# Patient Record
Sex: MALE | Race: WHITE | HISPANIC OR LATINO | Employment: FULL TIME | URBAN - METROPOLITAN AREA
[De-identification: names, ages, dates, MRNs, and addresses within clinical notes are randomized per-mention and may not be internally consistent; named-entity substitution may affect disease eponyms.]

---

## 2017-02-27 ENCOUNTER — HOSPITAL ENCOUNTER (EMERGENCY)
Facility: HOSPITAL | Age: 47
Discharge: HOME | End: 2017-02-27
Attending: EMERGENCY MEDICINE | Admitting: EMERGENCY MEDICINE
Payer: COMMERCIAL

## 2017-02-27 VITALS
TEMPERATURE: 96.9 F | RESPIRATION RATE: 20 BRPM | HEART RATE: 68 BPM | BODY MASS INDEX: 35 KG/M2 | WEIGHT: 223 LBS | SYSTOLIC BLOOD PRESSURE: 136 MMHG | HEIGHT: 67 IN | DIASTOLIC BLOOD PRESSURE: 78 MMHG | OXYGEN SATURATION: 97 %

## 2017-02-27 DIAGNOSIS — S09.90XA MINOR HEAD INJURY, INITIAL ENCOUNTER: ICD-10-CM

## 2017-02-27 DIAGNOSIS — S00.33XA CONTUSION OF NOSE, INITIAL ENCOUNTER: ICD-10-CM

## 2017-02-27 DIAGNOSIS — R04.0 NOSEBLEED: Primary | ICD-10-CM

## 2017-02-27 PROCEDURE — 99283 EMERGENCY DEPT VISIT LOW MDM: CPT

## 2017-02-27 RX ORDER — ESCITALOPRAM OXALATE 20 MG/1
20 TABLET ORAL DAILY
COMMUNITY
End: 2019-02-04 | Stop reason: SDUPTHER

## 2018-02-26 ENCOUNTER — APPOINTMENT (OUTPATIENT)
Dept: RADIOLOGY | Facility: CLINIC | Age: 48
End: 2018-02-26
Payer: OTHER MISCELLANEOUS

## 2018-02-26 ENCOUNTER — APPOINTMENT (OUTPATIENT)
Dept: URGENT CARE | Facility: CLINIC | Age: 48
End: 2018-02-26
Payer: OTHER MISCELLANEOUS

## 2018-02-26 DIAGNOSIS — M79.671 RIGHT FOOT PAIN: ICD-10-CM

## 2018-02-26 DIAGNOSIS — M79.671 RIGHT FOOT PAIN: Primary | ICD-10-CM

## 2018-02-26 PROCEDURE — 99283 EMERGENCY DEPT VISIT LOW MDM: CPT

## 2018-02-26 PROCEDURE — G0382 LEV 3 HOSP TYPE B ED VISIT: HCPCS

## 2018-02-26 PROCEDURE — 73630 X-RAY EXAM OF FOOT: CPT

## 2018-05-07 ENCOUNTER — TELEPHONE (OUTPATIENT)
Dept: BEHAVIORAL/MENTAL HEALTH CLINIC | Facility: CLINIC | Age: 48
End: 2018-05-07

## 2018-05-07 NOTE — TELEPHONE ENCOUNTER
Behavorial Health Outpatient Intake Questions    Referred by: PCP-DR KEMP    Check with provider before scheduling    Are there any developmental disabilities? No    Does the patient have hearing impairment? No    Does the patient have ICM or CTT? No    Taking injectable psychiatric medications? NoIf yes, patient can not be seen here  Has the patient ever seen or currently see a psychiatrist? Yes If yes who/when? SEEN ONCE ,UNABLE TO RECALL NAME    Has the patient ever seen or currently see a therapist? No If yes who/when? How many visits did the pt have for previous psychiatric treatment?  History    Has the patient served in the [place] [address]? Yes    If yes, have you had combat services? No    Was the patient activated into federal active duty as a member of the national guard or reserve? No    Minor Child    Who has custody of the child? N/A    Is there a custody agreement? N/A    If there is a custody agreement remind parent that they must bring a copy to the first appt or they will not be seen  [facility]     Presenting Problem (in patient's words) MOOD SWINGS,AGOROPHOBIA AND PARANOID    Substance Abuse:No concerns of substance abuse are reported  Has the patient been seen here previously, either inpatient or outpatient? Yes outpatient    If seen as outpatient, what provider(s) did the patient see? SEEN ON 5/4 WITH WIFE YAMILET BY LIZ FOR COUPLE'S COUNSELING    A member of the patient's family has been in therapy here with WIFE YAMILET SAW Chapincito Godinez as a patient Yes Appointment Date: 7/17/18 @ 1 [facility]    Referred Elsewhere?  No    Primary Care Physician: Noble Bentley MD    PCP telephone number: [phone]    SUB: BRADLY  INS: [facility]  ID: [id number]

## 2018-08-20 ENCOUNTER — OFFICE VISIT (OUTPATIENT)
Dept: BEHAVIORAL/MENTAL HEALTH CLINIC | Facility: CLINIC | Age: 48
End: 2018-08-20
Payer: COMMERCIAL

## 2018-08-20 DIAGNOSIS — F31.81 BIPOLAR II DISORDER (HCC): Primary | ICD-10-CM

## 2018-08-20 PROCEDURE — 90791 PSYCH DIAGNOSTIC EVALUATION: CPT | Performed by: SOCIAL WORKER

## 2018-08-20 NOTE — PSYCH
Assessment/Plan: I have had mood swings most of my life and they are getting worse  Tegretol  Subjective:      Patient ID: Dustin Obrien is a 51 y o  male  HPI:     Pre-morbid level of function and History of Present Illness:He has had the mood swings, depression, anxiety and anger  Previous Psychiatric/psychological treatment/year: Dr Nick Flores saw him once due to retiring  Current Psychiatrist/Therapist:the undersigned  Outpatient and/or Partial and Other [employer] Used will see the undersigned and a doctor  (CTT, ICM, VNA): no other services      Problem Assessment:     SOCIAL/VOCATION:  Family Constellation (include parents, relationship with each and pertinent Psych/Medical History):     No family history on file  Mother:Keshia 76  Spouse: Magalie 39   Father:zahraa Candelario  Children:n/a   Sibling:n/a   Sibling: n/a  Children: n/a  Other:n/a    Luz Marina relates best to [place]  he lives with [facility]  he does not live alone  No domestic violence    Additional Comments related to family/relationships/peer support: feels they are supportive  School or Work History (strengths/limitations/needs): Work ethic, loyal,helpfu I need to control my mood swings and my mood    Her highest grade level achieved was associates degree     history includes 3 years    Financial status includes we have debt    LEISURE ASSESSMENT (Include past and present hobbies/interests and level of involvement (Ex: Group/Club Affiliations): gardening camping hiking reading research  his primary language is [place]  Preferred language is [place]  Ethnic considerations are n/a  Religions affiliations and level of involvement Jain   Does spirituality help you cope?  Yes     FUNCTIONAL STATUS: There has been a recent change in Luz Marina ability to do the following: n/a    Level of Assistance Needed/By Whom?: n/a    Luz Marina learns best by  reading and visualization    SUBSTANCE ABUSE ASSESSMENT: no substance abuse    Substance/Route/Age/Amount/Frequency/Last Use: n/a    DETOX HISTORY: n/a    Previous detox/rehab treatment: n/a    HEALTH ASSESSMENT: no referral to PCP needed    LEGAL: n/a    Prenatal History: N/A    Delivery History: N/A    Developmental Milestones: N/A  Temperament as an infant was N/A  Temperament as a toddler was N/A  Temperament at school age was N/A  Temperament as a teenager was N/A  Risk Assessment:   The following ratings are based on my interview(s) with the client    Risk of Harm to Self:   Demographic risk factors include   Historical Risk Factors include a relative or close friend who  by suicide and chronic psychiatric problems  Recent Specific Risk Factors include recent losses recent friend who suicided, worries about finances or work and chronic pain or health problems  Additional Factors for a Child or Adolescent n/a    Risk of Harm to Others:   Demographic Risk Factors include male  Historical Risk Factors include victim of childhood bullying  Recent Specific Risk Factors include concomitant mood or thought disorder and multiple stressors    Access to Weapons:   Citlaly Duncan has access to the following weapons: yes he owns them   The following steps have been taken to ensure weapons are properly secured: yes  Based on the above information, the client presents the following risk of harm to self or others:  low    The following interventions are recommended:   referral to md    Notes regarding this Risk Assessment: low risk        Review Of Systems:     Mood Anxiety, Depression, Emotional Lability and Hostility   Behavior Normal    Thought Content Normal   General Emotional Problems, Decreased Functioning and 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   Personality Normal   Other Psych Symptoms Normal   Constitutional Feeling Tired   ENT Nosebleeds   Cardiovascular Normal  high cholesterol   Respiratory allergy induced asthma and sleep apnea   Gastrointestinal gerd weight loss surgery 5 years ago   Genitourinary IBS   Musculoskeletal Negative   Integumentary skin issues   Neurological Normal    Endocrine Normal          Mental status:  Appearance good eye contact    Mood depressed, irritable, anxious and angry   Affect affect was broad   Speech rapid speech he attributes to being a [place]   Thought Processes coherent/organized and normal thought processes   Hallucinations no hallucinations present    Thought Content no delusions   Abnormal Thoughts angry hostile feelings with no homicidal plan   Orientation  oriented to person, oriented to place and oriented to time   Remote Memory short term memory impaired   Attention Span concentration intact   Intellect Appears to be of Average Intelligence   Fund of Knowledge displays adequate knowledge of current events, adequate fund of knowledge regarding past history and adequate fund of knowledge regarding vocabulary    Insight Insight intact   Judgement judgment was intact   Muscle Strength Muscle strength and tone were normal and Normal gait    Language no difficulty naming common objects, no difficulty repeating a phrase  and no difficulty writing a sentence    Pain moderate to severe   Pain Scale 6

## 2018-08-20 NOTE — PSYCH
Luz Marina Man  1970       Date of Initial Treatment Plan: 08/20/2018  Date of Current Treatment Plan: 08/20/18    Treatment Plan Number Initial    Strengths/Personal Resources for Self Care: helpful, caring, great cook, hiking, gardening  Diagnosis:   1  Bipolar II disorder ([facility] [address] )         Area of Needs: I need to decrease and control my anger, my depression, my anxiety and my mood swings  Long Term Goal 1: I need to decrease my anger and my depression  Target Date:12/20/18  Completion Date:TBD          Short Term Objectives for Goal 1:anger management and mindfulness thru counseling and thru medication management    Long Term Goal 2: I need to decrease my anxiety and my mood swings    Target Date: 12/20/2018  Completion Date: TBD    Short Term Objectives for Goal 2: deep breathing, guided imagery and meditation         Long Term Goal # 3: N/A     Target Date: N/A  Completion Date: N/A    Short Term Objectives for Goal 3: N/A    GOAL 1: Modality: 2 times per month completion date tbd    GOAL 2: Modality: Individual 2x per month   Completion Date tbd     GOAL 3: Modality: Individual N/Ax per month   Completion Date N/A      Behavioral Health Treatment Plan [facility]: Diagnosis and Treatment Plan explained to Matty Jackson relates understanding diagnosis and is agreeable to Treatment Plan         Client Comments : Please share your thoughts, feelings, need and/or experiences regarding your treatment plan:       __________________________________________________________________    __________________________________________________________________    __________________________________________________________________    __________________________________________________________________    _______________________________________                Patient signature, Date Time: __________________________________________             Physician cosigner signature, Date, Time: ________________________________

## 2018-10-26 ENCOUNTER — OFFICE VISIT (OUTPATIENT)
Dept: BEHAVIORAL/MENTAL HEALTH CLINIC | Facility: CLINIC | Age: 48
End: 2018-10-26
Payer: COMMERCIAL

## 2018-10-26 DIAGNOSIS — Z59.9 FINANCIAL DIFFICULTIES: ICD-10-CM

## 2018-10-26 DIAGNOSIS — R45.4 DIFFICULTY CONTROLLING ANGER: Primary | ICD-10-CM

## 2018-10-26 DIAGNOSIS — F31.81 BIPOLAR II DISORDER (HCC): ICD-10-CM

## 2018-10-26 DIAGNOSIS — F41.1 GAD (GENERALIZED ANXIETY DISORDER): ICD-10-CM

## 2018-10-26 PROCEDURE — 90834 PSYTX W PT 45 MINUTES: CPT | Performed by: SOCIAL WORKER

## 2018-10-26 SDOH — ECONOMIC STABILITY - INCOME SECURITY: PROBLEM RELATED TO HOUSING AND ECONOMIC CIRCUMSTANCES, UNSPECIFIED: Z59.9

## 2018-10-26 NOTE — PSYCH
Psychotherapy Provided: Individual Psychotherapy 45 minutes     Length of time in session: 45 minutes, follow up in 2 week    Goals addressed in session: Goal 1 and Goal 2     Pain:      moderate to severe    0    Current suicide risk : Low     Data: Luz Marina arrived for his initial session  He brought me up to date since his assessment  We discussed his goals of managing his anger and depression and we worked on mindfulness strategies along with anger management skills  We discussed meditation and ways to calm himself down regarding managing his anxiety and modulating his mood swings  He is most upset about how work triggers him along with how his wife's spending affects him  Assessment: he was happy with the strategies we discussed and is open to reading recommended readings and trying the strategies we discuss  Plan: Will continue to help him skill build in distress tolerance  Will also see him and his wife  Behavioral Health Treatment Plan [facility]: Diagnosis and Treatment Plan explained to Paola Hendrickson relates understanding diagnosis and is agreeable to Treatment Plan   Yes

## 2018-10-30 ENCOUNTER — TELEPHONE (OUTPATIENT)
Dept: PSYCHIATRY | Facility: CLINIC | Age: 48
End: 2018-10-30

## 2018-10-30 NOTE — TELEPHONE ENCOUNTER
----- Message from Diego Ramsey sent at 10/29/2018  4:06 PM EDT -----  Absolutely  No problem  ----- Message -----  From: Deanne Prasad MA  Sent: 10/29/2018   1:46 PM  To: Leah Trejo, will this patient be okay with Murphy Jones Reasoner is scheduled out to 9/2019 ?  ----- Message -----  From: Sintia Alcala MD  Sent: 10/29/2018   1:37 PM  To: Ines Akhtar    Yes, it is OK to schedule although he would probably do better with a male psychiatrist if Dr Hill Pinon had an opening       ----- Message -----  From: Deanne Prasad MA  Sent: 10/29/2018   9:33 AM  To: Sintia Alcala MD    [facility], can we schedule John's patient with you for March ?  ----- Message -----  From: Diego Ramsey  Sent: 10/26/2018   4:21 PM  To: [facility]    Hi,  Could this man be offered an initial psychiatric appointment with one of the psychiatrists  He realizes it may be next March but he will follow his family MD instructions until then  He can be reached at [phone]  If you get his VM you may leave the appointment on that   Thanks Candace Sommer

## 2018-11-05 ENCOUNTER — TELEPHONE (OUTPATIENT)
Dept: PSYCHIATRY | Facility: CLINIC | Age: 48
End: 2018-11-05

## 2018-11-05 NOTE — TELEPHONE ENCOUNTER
----- Message from Darcy Jiménez sent at 11/5/2018 11:27 AM EST -----  Regarding: RE: Bad phone #  [phone]  ----- Message -----  From: Elvis London  Sent: 11/5/2018  11:17 AM  To: Jonnie Sheth, #  Subject: RE: Bad phone #                                  Can you get Brianda Eagle a working number please?  ----- Message -----  From: Ariadne Gee MA  Sent: 11/1/2018   8:44 AM  To: Elvis London  Subject: Bad phone #                                      I tried calling this pt, a female answered the phone and said there was no one there by this name  I confirmed with her the # I had dialed  Next visit maybe you can try to get a good # for him  There are no other #s on the acct    ----- Message -----  From: Luke Solitario MA  Sent: 10/30/2018   8:25 AM  To: Ariadne Gee MA        ----- Message -----  From: Elvis London  Sent: 10/29/2018   4:06 PM  To: Sergio Jung MD, Luke Solitario MA    Absolutely  No problem  ----- Message -----  From: Luke Solitario MA  Sent: 10/29/2018   1:46 PM  To: Felicity Rodriguez, will this patient be okay with Marco Santanadam is scheduled out to 9/2019 ?  ----- Message -----  From: Sergio Jung MD  Sent: 10/29/2018   1:37 PM  To: Ines Lakhani    Yes, it is OK to schedule although he would probably do better with a male psychiatrist if Dr hSira Byrnes had an opening       ----- Message -----  From: Luke Solitario MA  Sent: 10/29/2018   9:33 AM  To: MD Elizabeth Borrero, can we schedule John's patient with you for March ?  ----- Message -----  From: Elvis London  Sent: 10/26/2018   4:21 PM  To: [facility],  Could this man be offered an initial psychiatric appointment with one of the psychiatrists  He realizes it may be next March but he will follow his family MD instructions until then  He can be reached at [phone]  If you get his VM you may leave the appointment on that   Thanks Daniel Minaya

## 2018-11-26 ENCOUNTER — OFFICE VISIT (OUTPATIENT)
Dept: BEHAVIORAL/MENTAL HEALTH CLINIC | Facility: CLINIC | Age: 48
End: 2018-11-26
Payer: COMMERCIAL

## 2018-11-26 DIAGNOSIS — F33.1 MODERATE EPISODE OF RECURRENT MAJOR DEPRESSIVE DISORDER (HCC): ICD-10-CM

## 2018-11-26 DIAGNOSIS — R45.4 ANGER: ICD-10-CM

## 2018-11-26 DIAGNOSIS — F41.1 GAD (GENERALIZED ANXIETY DISORDER): Primary | ICD-10-CM

## 2018-11-26 PROCEDURE — 90834 PSYTX W PT 45 MINUTES: CPT | Performed by: SOCIAL WORKER

## 2018-11-26 NOTE — PSYCH
Psychotherapy Provided: Individual Psychotherapy 45 minutes     Length of time in session: 45 minutes, follow up in 2 week    Goals addressed in session: Goal 1, Goal 2 and Goal 3      Pain:      moderate to severe    0    Current suicide risk : Low     Data: The client arrived for his session  He shared he is reading as I recommended topics on mindfulness  I have bad knees, plantar facitis and they found a lump on foot  He is seeing an MD  He admits his anxiety, mood swings are still there as is his anger  However his depression is better  He is practicing mindfulness  He discussed some issues between him and his wife  He discussed some of his anger issues and his triggers  We have financial issues and she spends yet she controls the finances  She spends lots of money on jewelery yet they are hurting for finances, spending makes her feel better and there are things that need to be done at the home but cant due to her spending  We discussed I messages in communication  Assessment: We discussed his goals of managing his anger, his depression, his anxiety and his mood swings  He discussed his wife's addictions and it is spending, it is jewelery, and it is pets  We discussed mindfulness strategies  We also discussed anger management  Plan: Continue to skill build in distress tolerance and to help him with their relationship  Help him decrease his anxiety which is worse than his depression  Behavioral Health Treatment Plan [facility]: Diagnosis and Treatment Plan explained to Nay Faye relates understanding diagnosis and is agreeable to Treatment Plan   Yes

## 2018-12-12 ENCOUNTER — OFFICE VISIT (OUTPATIENT)
Dept: BEHAVIORAL/MENTAL HEALTH CLINIC | Facility: CLINIC | Age: 48
End: 2018-12-12
Payer: COMMERCIAL

## 2018-12-12 DIAGNOSIS — F31.81 BIPOLAR II DISORDER (HCC): Primary | ICD-10-CM

## 2018-12-12 PROCEDURE — 90834 PSYTX W PT 45 MINUTES: CPT | Performed by: SOCIAL WORKER

## 2018-12-12 NOTE — PSYCH
Psychotherapy Provided: Individual Psychotherapy 45 minutes     Length of time in session: 45 minutes, follow up in 2 week    Goals addressed in session: Goal 1, Goal 2 and Goal 3      Pain:      moderate to severe    0    Current suicide risk : Low     Data: Luz Marina arrived for his session  He shared in his words he is a work in progress and it is the small things in life and on his job that affect him  He talked about many of the stressors at work  He is the  of his kitchen yet the  over him does not give him the authority that a  usually has over his kitchen  He talked about the politics of the workplace he is in  We discussed communication skills, anger management skills, the use of I messages, learning what the client can control and what he cant control  Assessment: The client feels the strategies presented are helpful  We discussed all the strategies mentioned  Plan: Continue to skill build in distress tolerance  Behavioral Health Treatment Plan [facility]: Diagnosis and Treatment Plan explained to Markie Worthy relates understanding diagnosis and is agreeable to Treatment Plan   Yes
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
## 2019-02-04 ENCOUNTER — OFFICE VISIT (OUTPATIENT)
Dept: PSYCHIATRY | Facility: CLINIC | Age: 49
End: 2019-02-04
Payer: COMMERCIAL

## 2019-02-04 VITALS
HEART RATE: 82 BPM | SYSTOLIC BLOOD PRESSURE: 146 MMHG | BODY MASS INDEX: 38.09 KG/M2 | HEIGHT: 66 IN | DIASTOLIC BLOOD PRESSURE: 83 MMHG | WEIGHT: 237 LBS

## 2019-02-04 DIAGNOSIS — F41.1 GAD (GENERALIZED ANXIETY DISORDER): Chronic | ICD-10-CM

## 2019-02-04 DIAGNOSIS — F31.81 BIPOLAR II DISORDER (HCC): Primary | Chronic | ICD-10-CM

## 2019-02-04 DIAGNOSIS — F63.9 IMPULSE CONTROL DISORDER: Chronic | ICD-10-CM

## 2019-02-04 DIAGNOSIS — Z79.899 LONG-TERM USE OF HIGH-RISK MEDICATION: Chronic | ICD-10-CM

## 2019-02-04 PROCEDURE — 90792 PSYCH DIAG EVAL W/MED SRVCS: CPT | Performed by: PSYCHIATRY & NEUROLOGY

## 2019-02-04 RX ORDER — ESCITALOPRAM OXALATE 20 MG/1
20 TABLET ORAL DAILY
Qty: 30 TABLET | Refills: 2 | Status: SHIPPED | OUTPATIENT
Start: 2019-02-04 | End: 2019-03-29 | Stop reason: SDUPTHER

## 2019-02-04 RX ORDER — ATORVASTATIN CALCIUM 10 MG/1
10 TABLET, FILM COATED ORAL DAILY
Refills: 1 | COMMUNITY
Start: 2019-01-05

## 2019-02-04 RX ORDER — OXCARBAZEPINE 150 MG/1
150 TABLET, FILM COATED ORAL 2 TIMES DAILY
Qty: 60 TABLET | Refills: 2 | Status: SHIPPED | OUTPATIENT
Start: 2019-02-04 | End: 2019-03-29 | Stop reason: SDUPTHER

## 2019-02-21 LAB
ALBUMIN SERPL-MCNC: 4.7 G/DL (ref 3.5–5.5)
ALBUMIN/GLOB SERPL: 2 {RATIO} (ref 1.2–2.2)
ALP SERPL-CCNC: 67 IU/L (ref 39–117)
ALT SERPL-CCNC: 22 IU/L (ref 0–44)
AST SERPL-CCNC: 17 IU/L (ref 0–40)
BASOPHILS # BLD AUTO: 0 X10E3/UL (ref 0–0.2)
BASOPHILS NFR BLD AUTO: 1 %
BILIRUB SERPL-MCNC: 0.6 MG/DL (ref 0–1.2)
BUN SERPL-MCNC: 15 MG/DL (ref 6–24)
BUN/CREAT SERPL: 15 (ref 9–20)
CALCIUM SERPL-MCNC: 9.8 MG/DL (ref 8.7–10.2)
CHLORIDE SERPL-SCNC: 100 MMOL/L (ref 96–106)
CHOLEST SERPL-MCNC: 201 MG/DL (ref 100–199)
CHOLEST/HDLC SERPL: 4 RATIO (ref 0–5)
CO2 SERPL-SCNC: 26 MMOL/L (ref 20–29)
CREAT SERPL-MCNC: 1 MG/DL (ref 0.76–1.27)
EOSINOPHIL # BLD AUTO: 0.2 X10E3/UL (ref 0–0.4)
EOSINOPHIL NFR BLD AUTO: 3 %
ERYTHROCYTE [DISTWIDTH] IN BLOOD BY AUTOMATED COUNT: 13.2 % (ref 12.3–15.4)
GLOBULIN SER-MCNC: 2.4 G/DL (ref 1.5–4.5)
GLUCOSE SERPL-MCNC: 91 MG/DL (ref 65–99)
HCT VFR BLD AUTO: 45 % (ref 37.5–51)
HDLC SERPL-MCNC: 50 MG/DL
HGB BLD-MCNC: 15 G/DL (ref 13–17.7)
IMM GRANULOCYTES # BLD: 0 X10E3/UL (ref 0–0.1)
IMM GRANULOCYTES NFR BLD: 1 %
LDLC SERPL CALC-MCNC: 112 MG/DL (ref 0–99)
LYMPHOCYTES # BLD AUTO: 1.7 X10E3/UL (ref 0.7–3.1)
LYMPHOCYTES NFR BLD AUTO: 28 %
MCH RBC QN AUTO: 28.4 PG (ref 26.6–33)
MCHC RBC AUTO-ENTMCNC: 33.3 G/DL (ref 31.5–35.7)
MCV RBC AUTO: 85 FL (ref 79–97)
MONOCYTES # BLD AUTO: 0.5 X10E3/UL (ref 0.1–0.9)
MONOCYTES NFR BLD AUTO: 9 %
NEUTROPHILS # BLD AUTO: 3.6 X10E3/UL (ref 1.4–7)
NEUTROPHILS NFR BLD AUTO: 58 %
PLATELET # BLD AUTO: 292 X10E3/UL (ref 150–379)
POTASSIUM SERPL-SCNC: 4.9 MMOL/L (ref 3.5–5.2)
PROT SERPL-MCNC: 7.1 G/DL (ref 6–8.5)
RBC # BLD AUTO: 5.28 X10E6/UL (ref 4.14–5.8)
SL AMB EGFR AFRICAN AMERICAN: 102 ML/MIN/1.73
SL AMB EGFR NON AFRICAN AMERICAN: 88 ML/MIN/1.73
SL AMB VLDL CHOLESTEROL CALC: 39 MG/DL (ref 5–40)
SODIUM SERPL-SCNC: 141 MMOL/L (ref 134–144)
TRIGL SERPL-MCNC: 193 MG/DL (ref 0–149)
TSH SERPL DL<=0.005 MIU/L-ACNC: 1.69 UIU/ML (ref 0.45–4.5)
WBC # BLD AUTO: 6.1 X10E3/UL (ref 3.4–10.8)

## 2019-03-21 ENCOUNTER — OFFICE VISIT (OUTPATIENT)
Dept: BEHAVIORAL/MENTAL HEALTH CLINIC | Facility: CLINIC | Age: 49
End: 2019-03-21
Payer: COMMERCIAL

## 2019-03-21 DIAGNOSIS — F41.1 GAD (GENERALIZED ANXIETY DISORDER): Chronic | ICD-10-CM

## 2019-03-21 DIAGNOSIS — F31.81 BIPOLAR II DISORDER (HCC): Primary | Chronic | ICD-10-CM

## 2019-03-21 DIAGNOSIS — F63.9 IMPULSE CONTROL DISORDER: Chronic | ICD-10-CM

## 2019-03-21 PROCEDURE — 90834 PSYTX W PT 45 MINUTES: CPT | Performed by: SOCIAL WORKER

## 2019-03-21 NOTE — PSYCH
Psychotherapy Provided: Individual Psychotherapy 45 minutes     Length of time in session: 45 minutes, follow up in 2 week    Goals addressed in session: Goal 1, Goal 2 and Goal 3      Pain:      moderate to severe    4    Current suicide risk : Low     Data:His recovery plan update was due 12/20/18  However today was the first time back since that date and he asked if we could talk about his current goals and his progress to report and agreed to update it next session  Luz Marina feels his anger, his depression, his anxiety and his mood swings all of his goals are better due to the mindfulness work we have done in sessions and due to the medication  He gave various examples of things at work how he use to handle situations like that and how he handles them now  We talked further mindfulness strategies  He is also going to read more on distress tolerance and radical acceptance  Although still upset how his wife handles cash he is dealing with it more effectively  Assessment: Luz Marina was so happy to report the progress on his current goals and he sees for himself that things are better  Plan: We will update his plan next session  Behavioral Health Treatment Plan [facility]: Diagnosis and Treatment Plan explained to Nay Faye relates understanding diagnosis and is agreeable to Treatment Plan   Yes

## 2019-03-22 NOTE — PSYCH
Treatment Plan Tracking    # updatedTreatment Plan not completed within required time limits due to:it was due 12/20  However this was the first session he was here since then  He asked if he could talk about his current goals and the progress he is making on these goals since he does not come often to sessions  Please see my progress note  We will update it next session    This was his request

## 2019-03-27 ENCOUNTER — DOCUMENTATION (OUTPATIENT)
Dept: PSYCHIATRY | Facility: CLINIC | Age: 49
End: 2019-03-27

## 2019-03-28 ENCOUNTER — TELEPHONE (OUTPATIENT)
Dept: PSYCHIATRY | Facility: CLINIC | Age: 49
End: 2019-03-28
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
## 2019-03-29 ENCOUNTER — OFFICE VISIT (OUTPATIENT)
Dept: PSYCHIATRY | Facility: CLINIC | Age: 49
End: 2019-03-29
Payer: COMMERCIAL

## 2019-03-29 VITALS
DIASTOLIC BLOOD PRESSURE: 92 MMHG | WEIGHT: 239 LBS | HEART RATE: 89 BPM | SYSTOLIC BLOOD PRESSURE: 161 MMHG | HEIGHT: 66 IN | BODY MASS INDEX: 38.41 KG/M2

## 2019-03-29 DIAGNOSIS — Z79.899 LONG-TERM USE OF HIGH-RISK MEDICATION: Chronic | ICD-10-CM

## 2019-03-29 DIAGNOSIS — F63.9 IMPULSE CONTROL DISORDER: Chronic | ICD-10-CM

## 2019-03-29 DIAGNOSIS — F41.1 GAD (GENERALIZED ANXIETY DISORDER): Chronic | ICD-10-CM

## 2019-03-29 DIAGNOSIS — F31.81 BIPOLAR II DISORDER (HCC): Primary | Chronic | ICD-10-CM

## 2019-03-29 PROCEDURE — 90833 PSYTX W PT W E/M 30 MIN: CPT | Performed by: PSYCHIATRY & NEUROLOGY

## 2019-03-29 PROCEDURE — 99213 OFFICE O/P EST LOW 20 MIN: CPT | Performed by: PSYCHIATRY & NEUROLOGY

## 2019-03-29 RX ORDER — ESCITALOPRAM OXALATE 20 MG/1
20 TABLET ORAL DAILY
Qty: 30 TABLET | Refills: 3 | Status: SHIPPED | OUTPATIENT
Start: 2019-03-29 | End: 2019-08-01 | Stop reason: SDUPTHER

## 2019-03-29 RX ORDER — OXCARBAZEPINE 150 MG/1
TABLET, FILM COATED ORAL
Qty: 60 TABLET | Refills: 3 | Status: SHIPPED | OUTPATIENT
Start: 2019-03-29 | End: 2019-08-01 | Stop reason: SDUPTHER

## 2019-04-22 ENCOUNTER — OFFICE VISIT (OUTPATIENT)
Dept: BEHAVIORAL/MENTAL HEALTH CLINIC | Facility: CLINIC | Age: 49
End: 2019-04-22
Payer: COMMERCIAL

## 2019-04-22 DIAGNOSIS — F63.9 IMPULSE CONTROL DISORDER: Primary | Chronic | ICD-10-CM

## 2019-04-22 DIAGNOSIS — F41.1 GAD (GENERALIZED ANXIETY DISORDER): Chronic | ICD-10-CM

## 2019-04-22 DIAGNOSIS — F31.81 BIPOLAR II DISORDER (HCC): Chronic | ICD-10-CM

## 2019-04-22 PROCEDURE — 90834 PSYTX W PT 45 MINUTES: CPT | Performed by: SOCIAL WORKER

## 2019-06-19 ENCOUNTER — OFFICE VISIT (OUTPATIENT)
Dept: BEHAVIORAL/MENTAL HEALTH CLINIC | Facility: CLINIC | Age: 49
End: 2019-06-19
Payer: COMMERCIAL

## 2019-06-19 DIAGNOSIS — F63.9 IMPULSE CONTROL DISORDER: Chronic | ICD-10-CM

## 2019-06-19 DIAGNOSIS — F41.1 GAD (GENERALIZED ANXIETY DISORDER): Chronic | ICD-10-CM

## 2019-06-19 DIAGNOSIS — F31.81 BIPOLAR II DISORDER (HCC): Primary | Chronic | ICD-10-CM

## 2019-06-19 PROCEDURE — 90834 PSYTX W PT 45 MINUTES: CPT | Performed by: SOCIAL WORKER
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
## 2019-08-01 ENCOUNTER — OFFICE VISIT (OUTPATIENT)
Dept: PSYCHIATRY | Facility: CLINIC | Age: 49
End: 2019-08-01
Payer: COMMERCIAL

## 2019-08-01 VITALS
WEIGHT: 237 LBS | HEIGHT: 66 IN | HEART RATE: 68 BPM | BODY MASS INDEX: 38.09 KG/M2 | DIASTOLIC BLOOD PRESSURE: 85 MMHG | SYSTOLIC BLOOD PRESSURE: 132 MMHG

## 2019-08-01 DIAGNOSIS — F41.1 GAD (GENERALIZED ANXIETY DISORDER): Chronic | ICD-10-CM

## 2019-08-01 DIAGNOSIS — F63.9 IMPULSE CONTROL DISORDER: Chronic | ICD-10-CM

## 2019-08-01 DIAGNOSIS — F31.81 BIPOLAR II DISORDER (HCC): Primary | Chronic | ICD-10-CM

## 2019-08-01 DIAGNOSIS — Z79.899 LONG-TERM USE OF HIGH-RISK MEDICATION: Chronic | ICD-10-CM

## 2019-08-01 PROCEDURE — 99213 OFFICE O/P EST LOW 20 MIN: CPT | Performed by: PSYCHIATRY & NEUROLOGY

## 2019-08-01 PROCEDURE — 90833 PSYTX W PT W E/M 30 MIN: CPT | Performed by: PSYCHIATRY & NEUROLOGY

## 2019-08-01 RX ORDER — OXCARBAZEPINE 150 MG/1
150 TABLET, FILM COATED ORAL 2 TIMES DAILY
Qty: 60 TABLET | Refills: 3 | Status: SHIPPED | OUTPATIENT
Start: 2019-08-01 | End: 2019-12-04 | Stop reason: SDUPTHER

## 2019-08-01 RX ORDER — FENOFIBRATE 48 MG/1
48 TABLET, COATED ORAL DAILY
Refills: 6 | COMMUNITY
Start: 2019-07-17

## 2019-08-01 RX ORDER — ESCITALOPRAM OXALATE 20 MG/1
20 TABLET ORAL DAILY
Qty: 30 TABLET | Refills: 3 | Status: SHIPPED | OUTPATIENT
Start: 2019-08-01 | End: 2019-12-04 | Stop reason: SDUPTHER

## 2019-08-01 NOTE — BH TREATMENT PLAN
TREATMENT PLAN (Medication Management Only)        [facility]    Name/Date of Birth/MRN:  Luz Marina Man 47 y o  1970 MRN: [id number]  Date of Treatment Plan: August 1, 2019  Diagnosis/Diagnoses:   1  Bipolar II disorder ([address] )    2  SANJEEV (generalized anxiety disorder)    3  Impulse control disorder    4  Long-term use of high-risk medication      Strengths/Personal Resources for Self-Care: "thinker, listener, focuser, productive, curious, open minded, love to learn"  Area/Areas of need (in own words): "agoraphobia, mood swings, anger, depression, procrastinator, passive-aggressive"  1  Long Term Goal: improve mood stability  "Total wellness, mind and body; inner peace"   Target Date: 4 months - 12/1/2019  Person/Persons responsible for completion of goal: Luz Marina  2  Short Term Objective (s) - How will we reach this goal?:   A  Provider new recommended medication/dosage changes and/or continue medication(s): continue current medications as prescribed (Lexapro and Trileptal)  B   "learn to meditate by attention, a weekly Amish meditation session  Practice mindfulness,  radical acceptance, distress tolerance"  C   N/A  Target Date: 4 months - 12/1/2019  Person/Persons Responsible for Completion of Goal: Luz Marina   Progress Towards Goals: improving  Treatment Modality: medication management every 4 months, continue psychotherapy with [facility] therapist  Review due 90 to 120 days from date of this plan: 4 months - 12/1/2019  Expected length of service: ongoing treatment  My Physician/PA/NP and I have developed this plan together and I agree to work on the goals and objectives  I understand the treatment goals that were developed for my treatment      Jose Momin MD 08/01/19

## 2019-08-03 LAB
BUN SERPL-MCNC: 15 MG/DL (ref 6–24)
BUN/CREAT SERPL: 16 (ref 9–20)
CALCIUM SERPL-MCNC: 9.9 MG/DL (ref 8.7–10.2)
CHLORIDE SERPL-SCNC: 99 MMOL/L (ref 96–106)
CO2 SERPL-SCNC: 24 MMOL/L (ref 20–29)
CREAT SERPL-MCNC: 0.95 MG/DL (ref 0.76–1.27)
GLUCOSE SERPL-MCNC: 81 MG/DL (ref 65–99)
POTASSIUM SERPL-SCNC: 4.5 MMOL/L (ref 3.5–5.2)
SL AMB EGFR AFRICAN AMERICAN: 107 ML/MIN/1.73
SL AMB EGFR NON AFRICAN AMERICAN: 93 ML/MIN/1.73
SODIUM SERPL-SCNC: 137 MMOL/L (ref 134–144)
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
## 2019-12-04 ENCOUNTER — OFFICE VISIT (OUTPATIENT)
Dept: PSYCHIATRY | Facility: CLINIC | Age: 49
End: 2019-12-04
Payer: COMMERCIAL

## 2019-12-04 VITALS
BODY MASS INDEX: 37.12 KG/M2 | DIASTOLIC BLOOD PRESSURE: 87 MMHG | HEART RATE: 75 BPM | HEIGHT: 66 IN | SYSTOLIC BLOOD PRESSURE: 141 MMHG | WEIGHT: 231 LBS

## 2019-12-04 DIAGNOSIS — F41.1 GAD (GENERALIZED ANXIETY DISORDER): Chronic | ICD-10-CM

## 2019-12-04 DIAGNOSIS — Z79.899 LONG-TERM USE OF HIGH-RISK MEDICATION: Chronic | ICD-10-CM

## 2019-12-04 DIAGNOSIS — F31.81 BIPOLAR II DISORDER (HCC): Primary | Chronic | ICD-10-CM

## 2019-12-04 DIAGNOSIS — F63.9 IMPULSE CONTROL DISORDER: Chronic | ICD-10-CM

## 2019-12-04 PROBLEM — E66.01 MORBID OBESITY (HCC): Status: ACTIVE | Noted: 2019-12-04

## 2019-12-04 PROBLEM — G47.33 OBSTRUCTIVE SLEEP APNEA SYNDROME: Status: ACTIVE | Noted: 2019-12-04

## 2019-12-04 PROBLEM — G89.29 CHRONIC PAIN: Status: ACTIVE | Noted: 2019-12-04

## 2019-12-04 PROBLEM — D51.0 PERNICIOUS ANEMIA: Status: ACTIVE | Noted: 2019-12-04

## 2019-12-04 PROBLEM — E78.2 MIXED HYPERLIPIDEMIA: Status: ACTIVE | Noted: 2019-12-04

## 2019-12-04 PROCEDURE — 99213 OFFICE O/P EST LOW 20 MIN: CPT | Performed by: PSYCHIATRY & NEUROLOGY

## 2019-12-04 PROCEDURE — 90833 PSYTX W PT W E/M 30 MIN: CPT | Performed by: PSYCHIATRY & NEUROLOGY

## 2019-12-04 RX ORDER — OXCARBAZEPINE 300 MG/1
300 TABLET, FILM COATED ORAL 2 TIMES DAILY
Qty: 60 TABLET | Refills: 3 | Status: SHIPPED | OUTPATIENT
Start: 2019-12-04 | End: 2020-02-26 | Stop reason: SDUPTHER

## 2019-12-04 RX ORDER — MULTIVITAMIN WITH IRON
TABLET ORAL EVERY 24 HOURS
COMMUNITY

## 2019-12-04 RX ORDER — ESCITALOPRAM OXALATE 20 MG/1
20 TABLET ORAL DAILY
Qty: 30 TABLET | Refills: 3 | Status: SHIPPED | OUTPATIENT
Start: 2019-12-04 | End: 2020-02-26 | Stop reason: SDUPTHER

## 2019-12-04 NOTE — BH TREATMENT PLAN
TREATMENT PLAN (Medication Management Only)        [address]    Name/Date of Birth/MRN:  Luz Marina Man 47 y o  1970 MRN: [id number]  Date of Treatment Plan: December 4, 2019  Diagnosis/Diagnoses:   1  Bipolar II disorder ([address] )    2  SANJEEV (generalized anxiety disorder)    3  Impulse control disorder    4  Long-term use of high-risk medication      Strengths/Personal Resources for Self-Care: "perseverance, adaptability, work ethics"  Area/Areas of need (in own words): "mood swings"  1  Long Term Goal:   "inner peace"  Target Date: 2 months - 2/4/2020  Person/Persons responsible for completion of goal: Luz Marina  2  Short Term Objective (s) - How will we reach this goal?:   A  Provider new recommended medication/dosage changes and/or continue medication(s): increase Trileptal, continue all other medications (Lexapro)  B   "tools gained from therapist and continued medication, meditation and therapy"  C   N/A  Target Date: 2 months - 2/4/2020  Person/Persons Responsible for Completion of Goal: Luz Marina   Progress Towards Goals: progressing  Treatment Modality: medication management every 2 months, continue psychotherapy with [facility] therapist  Review due 180 days from date of this plan: 6 months - 6/4/2020  Expected length of service: ongoing treatment unless revised  My Physician/PA/NP and I have developed this plan together and I agree to work on the goals and objectives  I understand the treatment goals that were developed for my treatment    Electronic Signatures: on file (unless signed below)    Nga Mitchell MD 12/04/19

## 2020-01-29 ENCOUNTER — SOCIAL WORK (OUTPATIENT)
Dept: BEHAVIORAL/MENTAL HEALTH CLINIC | Facility: CLINIC | Age: 50
End: 2020-01-29
Payer: COMMERCIAL

## 2020-01-29 DIAGNOSIS — F41.1 GAD (GENERALIZED ANXIETY DISORDER): Chronic | ICD-10-CM

## 2020-01-29 DIAGNOSIS — F31.81 BIPOLAR II DISORDER (HCC): Primary | Chronic | ICD-10-CM

## 2020-01-29 DIAGNOSIS — F63.9 IMPULSE CONTROL DISORDER: Chronic | ICD-10-CM

## 2020-01-29 PROCEDURE — 90834 PSYTX W PT 45 MINUTES: CPT | Performed by: SOCIAL WORKER
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
## 2020-01-29 NOTE — BH TREATMENT PLAN
Luz Marina Man  1970       Date of Initial Treatment Plan: 08/20/2018   Date of Current Treatment Plan: 01/29/20    Treatment Plan Number 2nd update    Strengths/Personal Resources for Self Care: helpful to others, caring, cook,hiking,gardening    Diagnosis:   Bipolar II    Area of Needs: I need to continue to work on my triggers for anger, I still need to eat healthier and start working out  Long Term Goal 1: I need to continue to work on my triggers for anger  Target Date: 5/29/2020  Completion Date: TBD         Short Term Objectives for Goal 1: I need to learn and practice distress tolerance  Long Term Goal 2: I need to learn to eat healthier and start working out at the gym  Target Date: 05/29/2020  Completion Date: TBD    Short Term Objectives for Goal 2: I will talk strategies in therapy         Long Term Goal # 3: N/A     Target Date: N/A  Completion Date: N/A    Short Term Objectives for Goal 3: N/A    GOAL 1: Modality: Individual 2x per month   Completion Date TBD    GOAL 2: Modality: Individual 2x per month   Completion Date TBD     GOAL 3: Modality: Individual n/ax per month   Completion Date N/a      Behavioral Health Treatment Plan [facility]: Diagnosis and Treatment Plan explained to [place] relates understanding diagnosis and is agreeable to Treatment Plan  Client Comments : Please share your thoughts, feelings, need and/or experiences regarding your treatment plan:  These are Luz Marina's goals

## 2020-02-13 LAB
ALBUMIN SERPL-MCNC: 4.8 G/DL (ref 4–5)
ALBUMIN/GLOB SERPL: 2.4 {RATIO} (ref 1.2–2.2)
ALP SERPL-CCNC: 59 IU/L (ref 39–117)
ALT SERPL-CCNC: 27 IU/L (ref 0–44)
AST SERPL-CCNC: 22 IU/L (ref 0–40)
BILIRUB SERPL-MCNC: 0.6 MG/DL (ref 0–1.2)
BUN SERPL-MCNC: 15 MG/DL (ref 6–24)
BUN/CREAT SERPL: 15 (ref 9–20)
CALCIUM SERPL-MCNC: 9.6 MG/DL (ref 8.7–10.2)
CHLORIDE SERPL-SCNC: 100 MMOL/L (ref 96–106)
CO2 SERPL-SCNC: 26 MMOL/L (ref 20–29)
CREAT SERPL-MCNC: 0.99 MG/DL (ref 0.76–1.27)
GLOBULIN SER-MCNC: 2 G/DL (ref 1.5–4.5)
GLUCOSE SERPL-MCNC: 96 MG/DL (ref 65–99)
POTASSIUM SERPL-SCNC: 4.5 MMOL/L (ref 3.5–5.2)
PROT SERPL-MCNC: 6.8 G/DL (ref 6–8.5)
SL AMB EGFR AFRICAN AMERICAN: 102 ML/MIN/1.73
SL AMB EGFR NON AFRICAN AMERICAN: 88 ML/MIN/1.73
SODIUM SERPL-SCNC: 140 MMOL/L (ref 134–144)
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
## 2020-02-26 ENCOUNTER — OFFICE VISIT (OUTPATIENT)
Dept: PSYCHIATRY | Facility: CLINIC | Age: 50
End: 2020-02-26
Payer: COMMERCIAL

## 2020-02-26 VITALS
HEIGHT: 66 IN | SYSTOLIC BLOOD PRESSURE: 162 MMHG | DIASTOLIC BLOOD PRESSURE: 83 MMHG | BODY MASS INDEX: 37.45 KG/M2 | WEIGHT: 233 LBS | HEART RATE: 76 BPM

## 2020-02-26 DIAGNOSIS — F41.1 GAD (GENERALIZED ANXIETY DISORDER): Chronic | ICD-10-CM

## 2020-02-26 DIAGNOSIS — F31.81 BIPOLAR II DISORDER (HCC): Primary | Chronic | ICD-10-CM

## 2020-02-26 DIAGNOSIS — F63.9 IMPULSE CONTROL DISORDER: Chronic | ICD-10-CM

## 2020-02-26 PROCEDURE — 99213 OFFICE O/P EST LOW 20 MIN: CPT | Performed by: PSYCHIATRY & NEUROLOGY

## 2020-02-26 PROCEDURE — 90833 PSYTX W PT W E/M 30 MIN: CPT | Performed by: PSYCHIATRY & NEUROLOGY

## 2020-02-26 RX ORDER — OXCARBAZEPINE 300 MG/1
300 TABLET, FILM COATED ORAL 2 TIMES DAILY
Qty: 60 TABLET | Refills: 3 | Status: SHIPPED | OUTPATIENT
Start: 2020-02-26 | End: 2021-01-07 | Stop reason: SDUPTHER

## 2020-02-26 RX ORDER — ASCORBIC ACID 500 MG
500 TABLET ORAL DAILY
COMMUNITY

## 2020-02-26 RX ORDER — ESCITALOPRAM OXALATE 20 MG/1
20 TABLET ORAL DAILY
Qty: 30 TABLET | Refills: 3 | Status: SHIPPED | OUTPATIENT
Start: 2020-02-26 | End: 2020-08-21

## 2020-02-26 RX ORDER — BUSPIRONE HYDROCHLORIDE 5 MG/1
5 TABLET ORAL 2 TIMES DAILY
Qty: 60 TABLET | Refills: 3 | Status: SHIPPED | OUTPATIENT
Start: 2020-02-26 | End: 2020-06-28

## 2020-02-26 RX ORDER — NEOMYCIN/POLYMYXIN B/PRAMOXINE 3.5-10K-1
600 CREAM (GRAM) TOPICAL
COMMUNITY

## 2020-06-02 ENCOUNTER — DOCUMENTATION (OUTPATIENT)
Dept: PSYCHIATRY | Facility: CLINIC | Age: 50
End: 2020-06-02

## 2020-06-27 DIAGNOSIS — F41.1 GAD (GENERALIZED ANXIETY DISORDER): Primary | Chronic | ICD-10-CM

## 2020-06-28 RX ORDER — BUSPIRONE HYDROCHLORIDE 5 MG/1
5 TABLET ORAL 2 TIMES DAILY
Qty: 60 TABLET | Refills: 3 | Status: SHIPPED | OUTPATIENT
Start: 2020-06-28 | End: 2021-01-06

## 2020-08-21 DIAGNOSIS — F41.1 GAD (GENERALIZED ANXIETY DISORDER): Chronic | ICD-10-CM

## 2020-08-21 DIAGNOSIS — F31.81 BIPOLAR II DISORDER (HCC): Primary | Chronic | ICD-10-CM

## 2020-08-21 RX ORDER — ESCITALOPRAM OXALATE 20 MG/1
20 TABLET ORAL DAILY
Qty: 30 TABLET | Refills: 1 | Status: SHIPPED | OUTPATIENT
Start: 2020-08-21 | End: 2021-01-06

## 2020-11-01 DIAGNOSIS — F31.81 BIPOLAR II DISORDER (HCC): Chronic | ICD-10-CM

## 2020-11-01 DIAGNOSIS — F41.1 GAD (GENERALIZED ANXIETY DISORDER): Chronic | ICD-10-CM

## 2020-11-02 RX ORDER — ESCITALOPRAM OXALATE 20 MG/1
TABLET ORAL
Qty: 30 TABLET | Refills: 1 | OUTPATIENT
Start: 2020-11-02

## 2020-11-02 RX ORDER — BUSPIRONE HYDROCHLORIDE 5 MG/1
TABLET ORAL
Qty: 60 TABLET | Refills: 3 | OUTPATIENT
Start: 2020-11-02

## 2021-01-06 DIAGNOSIS — F31.81 BIPOLAR II DISORDER (HCC): Primary | Chronic | ICD-10-CM

## 2021-01-06 DIAGNOSIS — F41.1 GAD (GENERALIZED ANXIETY DISORDER): Chronic | ICD-10-CM

## 2021-01-06 RX ORDER — ESCITALOPRAM OXALATE 20 MG/1
20 TABLET ORAL DAILY
Qty: 30 TABLET | Refills: 1 | Status: SHIPPED | OUTPATIENT
Start: 2021-01-06 | End: 2021-02-22 | Stop reason: SDUPTHER

## 2021-01-06 RX ORDER — BUSPIRONE HYDROCHLORIDE 5 MG/1
5 TABLET ORAL 2 TIMES DAILY
Qty: 60 TABLET | Refills: 1 | Status: SHIPPED | OUTPATIENT
Start: 2021-01-06 | End: 2021-02-22 | Stop reason: SDUPTHER

## 2021-01-07 DIAGNOSIS — F31.81 BIPOLAR II DISORDER (HCC): Primary | Chronic | ICD-10-CM

## 2021-01-07 DIAGNOSIS — F63.9 IMPULSE CONTROL DISORDER: Chronic | ICD-10-CM

## 2021-01-07 RX ORDER — OXCARBAZEPINE 300 MG/1
300 TABLET, FILM COATED ORAL 2 TIMES DAILY
Qty: 60 TABLET | Refills: 1 | Status: SHIPPED | OUTPATIENT
Start: 2021-01-07 | End: 2021-02-22 | Stop reason: SDUPTHER

## 2021-02-09 ENCOUNTER — TELEMEDICINE (OUTPATIENT)
Dept: BEHAVIORAL/MENTAL HEALTH CLINIC | Facility: CLINIC | Age: 51
End: 2021-02-09
Payer: COMMERCIAL

## 2021-02-09 DIAGNOSIS — F63.9 IMPULSE CONTROL DISORDER: ICD-10-CM

## 2021-02-09 DIAGNOSIS — F31.81 BIPOLAR II DISORDER (HCC): ICD-10-CM

## 2021-02-09 DIAGNOSIS — F41.1 GAD (GENERALIZED ANXIETY DISORDER): ICD-10-CM

## 2021-02-09 PROCEDURE — 90834 PSYTX W PT 45 MINUTES: CPT | Performed by: SOCIAL WORKER

## 2021-02-09 NOTE — PSYCH
This note was not shared with the patient due to this is a psychotherapy note  Virtual Regular Visit      Assessment/Plan:    Problem List Items Addressed This Visit     None               Reason for visit is due to ongoing symptoms and covid 19 pandemic  Encounter provider Charla Mosquera    Provider located at [address] [place] [address] [address] [address] [phone]      Recent Visits  No visits were found meeting these conditions  Showing recent visits within past 7 days and meeting all other requirements     Future Appointments  No visits were found meeting these conditions  Showing future appointments within next 150 days and meeting all other requirements        The patient was identified by name and date of birth  Luz Marina CHARMAINE Donovan was informed that this is a telemedicine visit and that the visit is being conducted through [facility] and patient was informed that this is a secure, HIPAA-compliant platform  He agrees to proceed     My office door was closed  No one else was in the room  He acknowledged consent and understanding of privacy and security of the video platform  The patient has agreed to participate and understands they can discontinue the visit at any time  Patient is aware this is a billable service  Subjective  Brenna Capone is a 45 y o  male          HPI     Past Medical History:   Diagnosis Date    Arthritis     Head injury     Hyperlipidemia     Hypertension     Injury of peroneal tendon of right foot     Obesity     ANDRES (obstructive sleep apnea)     Pernicious anemia     Plantar fasciitis of right foot        Past Surgical History:   Procedure Laterality Date    KNEE SURGERY      SLEEVE GASTROPLASTY         Current Outpatient Medications   Medication Sig Dispense Refill    albuterol (PROVENTIL HFA,VENTOLIN HFA) 90 mcg/act inhaler 2 puffs every 6 (six) hours as needed       ascorbic acid (VITAMIN C) 500 mg tablet Take 500 mg by mouth daily      atorvastatin (LIPITOR) 10 mg tablet Take 10 mg by mouth daily  1    busPIRone (BUSPAR) 5 mg tablet Take 1 tablet (5 mg total) by mouth 2 (two) times a day 60 tablet 1    escitalopram (LEXAPRO) 20 mg tablet Take 1 tablet (20 mg total) by mouth daily 30 tablet 1    fenofibrate (TRICOR) 48 mg tablet Take 48 mg by mouth daily  6    Magnesium 250 MG TABS every 24 hours      Omega-3 Fatty Acids (FISH OIL PEARLS) 300 MG CAPS Take 600 mg by mouth      OXcarbazepine (TRILEPTAL) 300 mg tablet Take 1 tablet (300 mg total) by mouth 2 (two) times a day 60 tablet 1    TURMERIC PO Take by mouth daily       No current facility-administered medications for this visit  Allergies   Allergen Reactions    Penicillins        Review of Systems Data:Luz Marina has not been here in a awhile  Today he wanted to update me on where things are at regarding his symptoms  He admits he still has mood swings, he still procrastinates and he at times has agoraphobia  He claims the agoraphobia comes and goes  If he has to go to work he goes but if he does not have anything he needs to do he does not want to leave his house  He also admits his motivation is often affected  Assessment: Luz Marina denies suicidal/homicidal ideation, plan or intent  He shared he is wearing a bracelet called Radene Talisheek that per him he says helps him to focus and concentrate  He uses the mindfulness and the CBT strategies we have and continue to discuss  We discussed deep breathing and meditation  His wife continues to spend and Luz Marina is more accepting of this and realizes he cant control this and she needs to change  He appreciated our reconnection and he shared he simply needs someone to talk to  He asked we did his updated treatment plan next time because he wanted to reconnect and update the undersigned today   Plan: Continue to help Luz Marina skill build in distress tolerance ad continue the progress he has and continues to make  Video Exam    There were no vitals filed for this visit  Physical Exam N/A    I spent 45 minutes directly with the patient during this visit      VIRTUAL VISIT DISCLAIMER    Luz Marina Man acknowledges that he has consented to an online visit or consultation  He understands that the online visit is based solely on information provided by him, and that, in the absence of a face-to-face physical evaluation by the physician, the diagnosis he receives is both limited and provisional in terms of accuracy and completeness  This is not intended to replace a full medical face-to-face evaluation by the physician  Luz Marina Man understands and accepts these terms
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
## 2021-02-22 ENCOUNTER — TELEMEDICINE (OUTPATIENT)
Dept: PSYCHIATRY | Facility: CLINIC | Age: 51
End: 2021-02-22
Payer: COMMERCIAL

## 2021-02-22 VITALS — WEIGHT: 246 LBS | HEIGHT: 66 IN | BODY MASS INDEX: 39.53 KG/M2

## 2021-02-22 DIAGNOSIS — F63.9 IMPULSE CONTROL DISORDER: Chronic | ICD-10-CM

## 2021-02-22 DIAGNOSIS — F31.81 BIPOLAR II DISORDER (HCC): Primary | Chronic | ICD-10-CM

## 2021-02-22 DIAGNOSIS — F41.1 GAD (GENERALIZED ANXIETY DISORDER): Chronic | ICD-10-CM

## 2021-02-22 PROCEDURE — 99214 OFFICE O/P EST MOD 30 MIN: CPT | Performed by: PSYCHIATRY & NEUROLOGY

## 2021-02-22 PROCEDURE — 90833 PSYTX W PT W E/M 30 MIN: CPT | Performed by: PSYCHIATRY & NEUROLOGY

## 2021-02-22 RX ORDER — BUSPIRONE HYDROCHLORIDE 5 MG/1
5 TABLET ORAL 2 TIMES DAILY
Qty: 60 TABLET | Refills: 4 | Status: SHIPPED | OUTPATIENT
Start: 2021-02-22 | End: 2021-07-07 | Stop reason: SDUPTHER

## 2021-02-22 RX ORDER — MELOXICAM 7.5 MG/1
7.5 TABLET ORAL DAILY
COMMUNITY
Start: 2021-01-19 | End: 2021-07-07 | Stop reason: ALTCHOICE

## 2021-02-22 RX ORDER — ESCITALOPRAM OXALATE 20 MG/1
20 TABLET ORAL DAILY
Qty: 30 TABLET | Refills: 4 | Status: SHIPPED | OUTPATIENT
Start: 2021-02-22 | End: 2021-07-07 | Stop reason: SDUPTHER

## 2021-02-22 RX ORDER — OXCARBAZEPINE 300 MG/1
300 TABLET, FILM COATED ORAL 2 TIMES DAILY
Qty: 60 TABLET | Refills: 4 | Status: SHIPPED | OUTPATIENT
Start: 2021-02-22 | End: 2021-07-07 | Stop reason: SDUPTHER

## 2021-02-22 NOTE — BH TREATMENT PLAN
TREATMENT PLAN (Medication Management Only)        [facility]    Name/Date of Birth/MRN:  Luz Marina Man 45 y o  1970 MRN: [id number]  Date of Treatment Plan: February 22, 2021  Diagnosis/Diagnoses:   1  Bipolar II disorder ([address] )    2  SANJEEV (generalized anxiety disorder)    3  Impulse control disorder      Strengths/Personal Resources for Self-Care: "being mindful and recognizing that I am having mini manic attacks"  Area/Areas of need (in own words): "work on rest of my issues, agoraphobia, mood swings and procrastination"  1  Long Term Goal:   maintain control of anxiety, improve mood stability  Target Date: 4 months - 6/22/2021  Person/Persons responsible for completion of goal: Luz Marina  2  Short Term Objective (s) - How will we reach this goal?:   A  Provider new recommended medication/dosage changes and/or continue medication(s): continue current medications as prescribed (Lexapro, Trileptal and Buspar)  B   N/A   C   N/A  Target Date: 4 months - 6/22/2021  Person/Persons Responsible for Completion of Goal: Luz Marina   Progress Towards Goals: progressing  Treatment Modality: medication management every 4 months, continue psychotherapy with [facility] therapist  Review due 180 days from date of this plan: 6 months - 8/22/2021  Expected length of service: maintenance unless revised  My Physician/PA/NP and I have developed this plan together and I agree to work on the goals and objectives  I understand the treatment goals that were developed for my treatment    Electronic Signatures: on file (unless signed below)    Nathaly Camejo MD 02/22/21

## 2021-03-09 ENCOUNTER — TELEPHONE (OUTPATIENT)
Dept: PSYCHIATRY | Facility: CLINIC | Age: 51
End: 2021-03-09

## 2021-03-09 NOTE — TELEPHONE ENCOUNTER
LM for verbal confirmation of including diagnosis on letter needed for wearing special lenses for work

## 2021-03-30 ENCOUNTER — TELEMEDICINE (OUTPATIENT)
Dept: BEHAVIORAL/MENTAL HEALTH CLINIC | Facility: CLINIC | Age: 51
End: 2021-03-30
Payer: COMMERCIAL

## 2021-03-30 DIAGNOSIS — F63.9 IMPULSE CONTROL DISORDER: Chronic | ICD-10-CM

## 2021-03-30 DIAGNOSIS — F41.1 GAD (GENERALIZED ANXIETY DISORDER): Chronic | ICD-10-CM

## 2021-03-30 DIAGNOSIS — F31.81 BIPOLAR II DISORDER (HCC): Chronic | ICD-10-CM

## 2021-03-30 PROCEDURE — 90834 PSYTX W PT 45 MINUTES: CPT | Performed by: SOCIAL WORKER

## 2021-03-30 NOTE — BH TREATMENT PLAN
Luz Marina Man                   PLEASE NOTE: The undersigned was doing treatment plans every 4 months and the are only due every 6 months  Therefore the last plan was not due 5/29/20 but it was due 7/29/20  The issue is the last time the client was here for a session was 1/29/2020 and on 2/9/21 today this was the first time he has been back since 1/29/20  Therefore why a new plan is being done today  Also please note due to ongoing IT signature pad issues and due to ongoing covid 19 pandemic restrictions the client Luz Marina and the undersigned verbally signed off on the plan before sending it to the MD for signature  1970       Date of Initial Treatment Plan: 08/20/2018   Date of Current Treatment Plan: 02/09/2021    Treatment Plan Number 3rd update    Strengths/Personal Resources for Self Care: Helpful to others caring, good cook,enjoys hiking and gardening    Diagnosis:   1  Bipolar II disorder ([address] )     2  SANJEEV (generalized anxiety disorder)     3  Impulse control disorder         Area of Needs: Please see below      Long Term Goal 1: I still need to have better anger management strategies  Target Date: 08/09/2021  Completion Date: TBD         Short Term Objectives for Goal 1: I still need to focus and work on my anger triggers and I need to continue to apply the anger management strategies learned in therapy  Long Term Goal 2: I need to learn and accept what I can control and what and who I cant control  Target Date: 08/09/2021  Completion Date: TBD    Short Term Objectives for Goal 2: mindfulness, recite the serenity prayer and realize I cant control others especially at work and the fact my wife has to learn to manage her own issues            Long Term Goal # 3: I need to maintain a more stable mood     Target Date: 08/09/2021  Completion Date: TBD    Short Term Objectives for Goal 3: meditation, my part time job, relaxation strategies, exercise and diet    GOAL 1: Modality: Individual 2x per month Completion Date tbd    GOAL 2: Modality: Individual 2x per month   Completion Date tbd     GOAL 3: Modality: Individual 2x per month   Completion Date tbd      Behavioral Health Treatment Plan [facility]: Diagnosis and Treatment Plan explained to Nithya Morejon relates understanding diagnosis and is agreeable to Treatment Plan  Client Comments : Please share your thoughts, feelings, need and/or experiences regarding your treatment plan: The undersigned and Luz Marina will continue to work as a team to help him progress on his goals

## 2021-03-30 NOTE — PSYCH
This note was not shared with the patient due to this is a psychotherapy note  It was my intent to perform this visit via video technology but the patient was not able to do a video connection so the visit was completed via audio telephone only  Virtual Regular Visit      Assessment/Plan:    Problem List Items Addressed This Visit        Other    Bipolar II disorder ([address] ) (Chronic)    SANJEEV (generalized anxiety disorder) (Chronic)    Impulse control disorder (Chronic)               Reason for visit is due to ongoing symptoms and the ongoing covid 19 pandemic  Encounter provider Santiago Varela    Provider located at [address] [place] [address] [address] [place] [address] [phone]      Recent Visits  No visits were found meeting these conditions  Showing recent visits within past 7 days and meeting all other requirements     Today's Visits  Date Type Provider Dept   03/30/21 4050 [facility] Therapist Gus   Showing today's visits and meeting all other requirements     Future Appointments  No visits were found meeting these conditions  Showing future appointments within next 150 days and meeting all other requirements        The patient was identified by name and date of birth  Luz Marina Man was informed that this is a telemedicine visit and that the visit is being conducted through [employer] and patient was informed that this is a secure, HIPAA-compliant platform  He agrees to proceed     My office door was closed  No one else was in the room  He acknowledged consent and understanding of privacy and security of the video platform  The patient has agreed to participate and understands they can discontinue the visit at any time  Patient is aware this is a billable service  Lee Ann Perez is a 45 y o  male          HPI     Past Medical History:   Diagnosis Date    Arthritis     Head injury     Hyperlipidemia     Hypertension     Injury of peroneal tendon of right foot     Obesity     ANDRES (obstructive sleep apnea)     Pernicious anemia     Plantar fasciitis of right foot        Past Surgical History:   Procedure Laterality Date    KNEE SURGERY      SLEEVE GASTROPLASTY         Current Outpatient Medications   Medication Sig Dispense Refill    albuterol (PROVENTIL HFA,VENTOLIN HFA) 90 mcg/act inhaler 2 puffs every 6 (six) hours as needed       ascorbic acid (VITAMIN C) 500 mg tablet Take 500 mg by mouth daily      atorvastatin (LIPITOR) 10 mg tablet Take 10 mg by mouth daily  1    busPIRone (BUSPAR) 5 mg tablet Take 1 tablet (5 mg total) by mouth 2 (two) times a day 60 tablet 4    escitalopram (LEXAPRO) 20 mg tablet Take 1 tablet (20 mg total) by mouth daily 30 tablet 4    fenofibrate (TRICOR) 48 mg tablet Take 48 mg by mouth daily  6    Magnesium 250 MG TABS every 24 hours      meloxicam (MOBIC) 7 5 mg tablet Take 7 5 mg by mouth daily      Omega-3 Fatty Acids (FISH OIL PEARLS) 300 MG CAPS Take 600 mg by mouth      OXcarbazepine (TRILEPTAL) 300 mg tablet Take 1 tablet (300 mg total) by mouth 2 (two) times a day 60 tablet 4    TURMERIC PO Take by mouth daily       No current facility-administered medications for this visit  Allergies   Allergen Reactions    Penicillins        Review of Systems Data:Today Luz Marina admitted he still has anger issues at his full time job with coworkers who dont follow the rules with the clients which puts Luz Marina in awkward situations  We discussed anger management strategies and learning to accept what he cant control and what he can  He is learning to practice more letting go of things he cant control at work and or at home like his wife's spending and the fact she will not get help  Luz Marina got a part time job which he claims helps him maintain a steady mood and helps counter his wife's spending  These issues address his goals  Assessment: Luz Marina denies suicidal/homicidal ideation, plan or intent  He admits his biggest challenge is still controlling his anger better  He shared he knows he took a break from therapy but talking helps so much  We continue to address the goals of his treatment plan that we developed at his last session  Plan: Will continue to work as a team to help Luz Marina progress on his goals  Video Exam    There were no vitals filed for this visit  Physical Exam N/A    I spent 45 minutes directly with the patient during this visit      VIRTUAL VISIT DISCLAIMER    Luz Marina Man acknowledges that he has consented to an online visit or consultation  He understands that the online visit is based solely on information provided by him, and that, in the absence of a face-to-face physical evaluation by the physician, the diagnosis he receives is both limited and provisional in terms of accuracy and completeness  This is not intended to replace a full medical face-to-face evaluation by the physician  Luz Marina Man understands and accepts these terms

## 2021-04-20 ENCOUNTER — SOCIAL WORK (OUTPATIENT)
Dept: BEHAVIORAL/MENTAL HEALTH CLINIC | Facility: CLINIC | Age: 51
End: 2021-04-20
Payer: COMMERCIAL

## 2021-04-20 DIAGNOSIS — F31.81 BIPOLAR II DISORDER (HCC): Chronic | ICD-10-CM

## 2021-04-20 DIAGNOSIS — F41.1 GAD (GENERALIZED ANXIETY DISORDER): Chronic | ICD-10-CM

## 2021-04-20 DIAGNOSIS — F63.9 IMPULSE CONTROL DISORDER: Chronic | ICD-10-CM

## 2021-04-20 PROCEDURE — 90834 PSYTX W PT 45 MINUTES: CPT | Performed by: SOCIAL WORKER

## 2021-04-20 NOTE — PSYCH
Psychotherapy Provided: Individual Psychotherapy 45 minutes   This note was not shared with the patient due to this is a psychotherapy note  Length of time in session: 45 minutes, follow up in 2 week    Goals addressed in session: Goal 1, Goal 2 and Goal 3      Pain:      moderate to severe    0    Current suicide risk : Low     Data:Luz Marina discussed how he is working for [employer] but also part time for [employer]  He claims it helps him to get out and due to his wife's spending it helps with the finances  He has less than 4 years to retire and he discussed he uses the anger management strategies we have worked on to avoid altercations he use to get involved in  He is also getting better at accepting what is in his control and what is not  He discussed he is using mindfulness that we have discussed like distress tolerance and radical acceptance to maintain a more stable mood  Assessment: Luz Mraina denies suicidal/homicidal ideation, plan or intent  He feels he is less angry and his mood is more stable  He shared how therapy helps to ground him  We will continue to use mindfulness and distress tolerance   Plan: Will continue to help him progress on his goals  Behavioral Health Treatment Plan [facility]: Diagnosis and Treatment Plan explained to Kamala Byrd relates understanding diagnosis and is agreeable to Treatment Plan   Yes

## 2021-05-11 ENCOUNTER — SOCIAL WORK (OUTPATIENT)
Dept: BEHAVIORAL/MENTAL HEALTH CLINIC | Facility: CLINIC | Age: 51
End: 2021-05-11
Payer: COMMERCIAL

## 2021-05-11 DIAGNOSIS — F31.81 BIPOLAR II DISORDER (HCC): Chronic | ICD-10-CM

## 2021-05-11 DIAGNOSIS — F41.1 GAD (GENERALIZED ANXIETY DISORDER): Chronic | ICD-10-CM

## 2021-05-11 DIAGNOSIS — F63.9 IMPULSE CONTROL DISORDER: Chronic | ICD-10-CM

## 2021-05-11 PROCEDURE — 90834 PSYTX W PT 45 MINUTES: CPT | Performed by: SOCIAL WORKER

## 2021-05-11 NOTE — PSYCH
Psychotherapy Provided: Individual Psychotherapy 45 minutes   This note was not shared with the patient due to this is a psychotherapy note  Length of time in session: 45 minutes, follow up in 2 week    Encounter Diagnosis     ICD-10-CM    1  Bipolar II disorder ([address] )  F31 81    2  SANJEEV (generalized anxiety disorder)  F41 1    3  Impulse control disorder  F63 9        Goals addressed in session: Goal 1, Goal 2 and Goal 3      Pain:      moderate to severe    0    Current suicide risk : Low     Data: Luz Marina arrived for his session  His wife now works with him  He discussed a pleutonic friend his wife gets jealous about  He discussed how he is better at managing his anger  He is learning what is in his control and what isn't  He discussed his third goal of controlling his mood  He discussed his relationship with his wife  He gets upset about her spending  Assessment: Luz Marina is not suicidal/homicidal however he shared his wife's anxiety triggers his anxiety  He also gets angry about her spending  We discussed mindfulness and CBT  Plan: Continue to skill build in distress tolerance  Behavioral Health Treatment Plan [facility]: Diagnosis and Treatment Plan explained to Zelalem Bello relates understanding diagnosis and is agreeable to Treatment Plan   Yes
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
## 2021-07-07 ENCOUNTER — OFFICE VISIT (OUTPATIENT)
Dept: PSYCHIATRY | Facility: CLINIC | Age: 51
End: 2021-07-07
Payer: COMMERCIAL

## 2021-07-07 VITALS — BODY MASS INDEX: 38.73 KG/M2 | HEIGHT: 66 IN | WEIGHT: 241 LBS

## 2021-07-07 DIAGNOSIS — Z79.899 LONG-TERM USE OF HIGH-RISK MEDICATION: Chronic | ICD-10-CM

## 2021-07-07 DIAGNOSIS — F41.1 GAD (GENERALIZED ANXIETY DISORDER): Chronic | ICD-10-CM

## 2021-07-07 DIAGNOSIS — G47.09 OTHER INSOMNIA: Chronic | ICD-10-CM

## 2021-07-07 DIAGNOSIS — F63.9 IMPULSE CONTROL DISORDER: Chronic | ICD-10-CM

## 2021-07-07 DIAGNOSIS — F31.81 BIPOLAR II DISORDER (HCC): Primary | Chronic | ICD-10-CM

## 2021-07-07 PROCEDURE — 90833 PSYTX W PT W E/M 30 MIN: CPT | Performed by: PSYCHIATRY & NEUROLOGY

## 2021-07-07 PROCEDURE — 99214 OFFICE O/P EST MOD 30 MIN: CPT | Performed by: PSYCHIATRY & NEUROLOGY

## 2021-07-07 RX ORDER — BUSPIRONE HYDROCHLORIDE 5 MG/1
5 TABLET ORAL 2 TIMES DAILY
Qty: 60 TABLET | Refills: 4 | Status: SHIPPED | OUTPATIENT
Start: 2021-07-07 | End: 2021-12-04

## 2021-07-07 RX ORDER — QUETIAPINE FUMARATE 25 MG/1
25 TABLET, FILM COATED ORAL
Qty: 30 TABLET | Refills: 4 | Status: SHIPPED | OUTPATIENT
Start: 2021-07-07 | End: 2021-12-04

## 2021-07-07 RX ORDER — OXCARBAZEPINE 300 MG/1
300 TABLET, FILM COATED ORAL 2 TIMES DAILY
Qty: 60 TABLET | Refills: 4 | Status: SHIPPED | OUTPATIENT
Start: 2021-07-07 | End: 2021-12-04

## 2021-07-07 RX ORDER — ESCITALOPRAM OXALATE 20 MG/1
20 TABLET ORAL DAILY
Qty: 30 TABLET | Refills: 4 | Status: SHIPPED | OUTPATIENT
Start: 2021-07-07 | End: 2021-12-04

## 2021-07-07 NOTE — BH TREATMENT PLAN
TREATMENT PLAN (Medication Management Only)        [facility]    Name/Date of Birth/MRN:  Luz Marina Man 45 y o  1970 MRN: [id number]  Date of Treatment Plan: July 7, 2021  Diagnosis/Diagnoses:   1  Bipolar II disorder ([address] )    2  SANJEEV (generalized anxiety disorder)    3  Impulse control disorder    4  Long-term use of high-risk medication    5  Other insomnia      Strengths/Personal Resources for Self-Care: "perseverance, open mindness"  Area/Areas of need (in own words): "mood control, my procrastination"  1  Long Term Goal:   improve control of anxiety, maintain mood stability, help with sleep  Target Date: 2 months - 9/7/2021  Person/Persons responsible for completion of goal: Luz Marina  2  Short Term Objective (s) - How will we reach this goal?:   A  Provider new recommended medication/dosage changes and/or continue medication(s): start Seroquel, continue all other medications (Lexapro, Trileptal and Buspar)  B   N/A   C   N/A  Target Date: 2 months - 9/7/2021  Person/Persons Responsible for Completion of Goal: Luz Marina   Progress Towards Goals: moderate progress  Treatment Modality: medication management every 2 months  Review due 180 days from date of this plan: 6 months - 1/7/2022  Expected length of service: ongoing treatment unless revised  My Physician/PA/NP and I have developed this plan together and I agree to work on the goals and objectives  I understand the treatment goals that were developed for my treatment    Electronic Signatures: on file (unless signed below)    Shakeel Marcus MD 07/07/21

## 2021-07-19 ENCOUNTER — SOCIAL WORK (OUTPATIENT)
Dept: BEHAVIORAL/MENTAL HEALTH CLINIC | Facility: CLINIC | Age: 51
End: 2021-07-19
Payer: COMMERCIAL

## 2021-07-19 DIAGNOSIS — F41.1 GAD (GENERALIZED ANXIETY DISORDER): Chronic | ICD-10-CM

## 2021-07-19 DIAGNOSIS — F31.81 BIPOLAR II DISORDER (HCC): Chronic | ICD-10-CM

## 2021-07-19 DIAGNOSIS — F63.9 IMPULSE CONTROL DISORDER: Chronic | ICD-10-CM

## 2021-07-19 DIAGNOSIS — G47.09 OTHER INSOMNIA: Chronic | ICD-10-CM

## 2021-07-19 PROCEDURE — 90834 PSYTX W PT 45 MINUTES: CPT | Performed by: SOCIAL WORKER

## 2021-07-19 NOTE — PSYCH
Psychotherapy Provided: Individual Psychotherapy 45 minutes     Length of time in session: 45 minutes, follow up in 2 week    Encounter Diagnosis     ICD-10-CM    1  Bipolar II disorder ([address] )  F31 81    2  SANJEEV (generalized anxiety disorder)  F41 1    3  Impulse control disorder  F63 9    4  Other insomnia  G47 09        Goals addressed in session: Goal 1, Goal 2 and Goal 3      Pain:      moderate to severe    0    Current suicide risk : Low     Data:Luz Marina believes he is doing better with his anger  He feels his mood is more stable and he feels he is getting better at learning what he can control and what he cant  Assessment: He is not suicidal or homicidal however he still deals with anger although it is better  We continue to work on mindfulness and anger management  Plan: Continue to help him skill build in distress tolerance  Behavioral Health Treatment Plan [facility]: Diagnosis and Treatment Plan explained to Matty Jackson relates understanding diagnosis and is agreeable to Treatment Plan   Yes

## 2021-08-09 ENCOUNTER — TELEPHONE (OUTPATIENT)
Dept: PSYCHIATRY | Facility: CLINIC | Age: 51
End: 2021-08-09

## 2021-08-09 NOTE — TELEPHONE ENCOUNTER
Cx/RS: Patient contacted office and spoke with writer to cancel appointment for therapy on 8/9/2021 @ 2pm       Reason: Got called into work and just got home  Taking Sleep meds and he isn't going to be awake to make a 2pm appt  ,     Cancelled appointment out of provider's schedule  Please notify me high priority if you would like this appointment added back into schedule to mavis them as No Show (Late Cancellation)

## 2021-08-30 ENCOUNTER — SOCIAL WORK (OUTPATIENT)
Dept: BEHAVIORAL/MENTAL HEALTH CLINIC | Facility: CLINIC | Age: 51
End: 2021-08-30
Payer: COMMERCIAL

## 2021-08-30 DIAGNOSIS — F31.81 BIPOLAR II DISORDER (HCC): Primary | ICD-10-CM

## 2021-08-30 DIAGNOSIS — G47.09 OTHER INSOMNIA: ICD-10-CM

## 2021-08-30 DIAGNOSIS — F63.9 IMPULSE CONTROL DISORDER: ICD-10-CM

## 2021-08-30 DIAGNOSIS — F41.1 GAD (GENERALIZED ANXIETY DISORDER): ICD-10-CM

## 2021-08-30 PROCEDURE — 90834 PSYTX W PT 45 MINUTES: CPT | Performed by: SOCIAL WORKER

## 2021-08-30 NOTE — PSYCH
Psychotherapy Provided: couple therapy [id number]     Length of time in session: 45 minutes, follow up in 2 week    Bipolar II Disorder    Goals addressed in session: Goal 1, Goal 2 and Goal 3      Pain:      moderate to severe    0    Current suicide risk : Low     Data: The patient and his wife Austin Lucio arrived for the session  They discussed some of the recent negative interactions between the two of them  They mostly fight over her overspending forcing them to work extra  I actually gave her referrals to see her own therapist  We discussed some strategies  Assessment: clifford denies suicidal /homicidal ideation, plan or intent  He realizes he in his words was inappropriate Saturday with his wife, but both need therapy  She has low self image and over spends  The undersiged tried to work on some strategies to bring them together  Behavioral Health Treatment Plan [facility]: Diagnosis and Treatment Plan explained to Reyna Pena relates understanding diagnosis and is agreeable to Treatment Plan   Yes

## 2021-09-01 NOTE — BH TREATMENT PLAN
Luz Marina Man  1970       Date of Initial Treatment Plan: 08/20/18  Date of Current Treatment Plan: 08/30/2021    Treatment Plan Number 2nd update    Strengths/Personal Resources for Self Care: Helpful, caring,cooks, likes hiking, gardening    Diagnosis:   1  Bipolar II disorder ([facility] [address] )     2  Other insomnia     3  SANJEEV (generalized anxiety disorder)     4  Impulse control disorder         Area of Needs: Please see below      Long Term Goal 1: I still need to maintain a stable mood and control my anger  Target Date: 02/25/2022  Completion Date: TBD         Short Term Objectives for Goal 1: mindfulness, distress tolerance, CBT, anger management    Long Term Goal 2: I still have to learn and accept what I can control and what I can't  Target Date: 02/25/2022  Completion Date: TBD    Short Term Objectives for Goal 2: Mindfulness and solution focused strategies  Long Term Goal # 3: I need to eat healthy and work out  Target Date: 02/25/2022  Completion Date: TBD    Short Term Objectives for Goal 3: I will eat healthy and go to the gym    GOAL 1: Modality: Individual 2x per month   Completion Date tbd    GOAL 2: Modality: Individual 2x per month   Completion Date tbd     GOAL 3: Modality: Individual 2x per month   Completion Date tbd      Behavioral Health Treatment Plan [facility]: Diagnosis and Treatment Plan explained to Zohaib bradley understanding diagnosis and is agreeable to Treatment Plan  Client Comments : Please share your thoughts, feelings, need and/or experiences regarding your treatment plan: The client and the undersigned will work as a team to help him progress on his goals

## 2021-09-20 ENCOUNTER — SOCIAL WORK (OUTPATIENT)
Dept: BEHAVIORAL/MENTAL HEALTH CLINIC | Facility: CLINIC | Age: 51
End: 2021-09-20
Payer: COMMERCIAL

## 2021-09-20 DIAGNOSIS — G47.09 OTHER INSOMNIA: ICD-10-CM

## 2021-09-20 DIAGNOSIS — F63.9 IMPULSE CONTROL DISORDER: ICD-10-CM

## 2021-09-20 DIAGNOSIS — F31.81 BIPOLAR II DISORDER (HCC): Primary | ICD-10-CM

## 2021-09-20 DIAGNOSIS — F41.1 GAD (GENERALIZED ANXIETY DISORDER): ICD-10-CM

## 2021-09-20 PROCEDURE — 90834 PSYTX W PT 45 MINUTES: CPT | Performed by: SOCIAL WORKER

## 2021-09-20 NOTE — PSYCH
Psychotherapy Provided: Individual Psychotherapy 45 minutes     Length of time in session: 45 minutes, follow up in 2 week    Bipolar  Goals addressed in session: Goal 1, Goal 2 and Goal 3      Pain:      moderate to severe    0    Current suicide risk : Low     Data: Luz Marina has decided to leave his job due to issues  He is looking for another [employer] job  Now that he has resigned his mood is stable and he has less anger  He is learning what he can control and what he can't  He is trying to eat healthier  Assessment: He denies suicidal/homicidal ideation, plan or intent  However he is feeling better since he has resigned  E can stay till he finds another [employer] job  We worked on mindfulness and CBT  Plan: Continue to skill build in distress tolerance  Behavioral Health Treatment Plan [facility]: Diagnosis and Treatment Plan explained to Andrés Huerta relates understanding diagnosis and is agreeable to Treatment Plan   Yes

## 2021-12-20 ENCOUNTER — SOCIAL WORK (OUTPATIENT)
Dept: BEHAVIORAL/MENTAL HEALTH CLINIC | Facility: CLINIC | Age: 51
End: 2021-12-20
Payer: COMMERCIAL

## 2021-12-20 DIAGNOSIS — F41.1 GAD (GENERALIZED ANXIETY DISORDER): ICD-10-CM

## 2021-12-20 DIAGNOSIS — F31.81 BIPOLAR II DISORDER (HCC): ICD-10-CM

## 2021-12-20 DIAGNOSIS — F63.9 IMPULSE CONTROL DISORDER: Primary | ICD-10-CM

## 2021-12-20 PROCEDURE — 90834 PSYTX W PT 45 MINUTES: CPT | Performed by: SOCIAL WORKER

## 2022-02-21 ENCOUNTER — SOCIAL WORK (OUTPATIENT)
Dept: BEHAVIORAL/MENTAL HEALTH CLINIC | Facility: CLINIC | Age: 52
End: 2022-02-21
Payer: COMMERCIAL

## 2022-02-21 DIAGNOSIS — F63.9 IMPULSE CONTROL DISORDER: Primary | ICD-10-CM

## 2022-02-21 DIAGNOSIS — F31.81 BIPOLAR II DISORDER (HCC): ICD-10-CM

## 2022-02-21 DIAGNOSIS — F41.1 GAD (GENERALIZED ANXIETY DISORDER): ICD-10-CM

## 2022-02-21 PROCEDURE — 90834 PSYTX W PT 45 MINUTES: CPT | Performed by: SOCIAL WORKER

## 2022-02-21 NOTE — BH TREATMENT PLAN
Luz Marina Man                    Treatment Plan Update: His plan update was due 02/25/2022  However he was last here 12/20/2021 therefore today was his first time back since 12/20/21 and we did the update today  1970       Date of Initial Treatment Plan: 08/20/2018   Date of Current Treatment Plan: 02/21/22    Treatment Plan Number 3rd    Strengths/Personal Resources for Self Care: helpful,caring,cooks,hiking,gardening  Diagnosis:   1  Impulse control disorder     2  Bipolar II disorder ([facility] [address] )     3  SANJEEV (generalized anxiety disorder)         Area of Needs: Please see below      Long Term Goal 1: I need to continue to work on future mood stability and to manage my anger  Target Date: 08/15/2022  Completion Date: TBD         Short Term Objectives for Goal 1: mindfulness, CBT and anger management strategies  Long Term Goal 2: I still need to learn and accept what I can control and what I can't  Target Date: 08/15/2022  Completion Date: TBD    Short Term Objectives for Goal 2: Mindfulness         Long Term Goal # 3: I still need to eat healthy and exercise  Target Date: 08/15/2022  Completion Date: TBD    Short Term Objectives for Goal 3: continue to monitor my intake and to continue to exercise  GOAL 1: Modality: Individual 2x per month   Completion Date tbd    GOAL 2: Modality: Individual 2x per month   Completion Date tbd     GOAL 3: Modality: Individual 2x per month   Completion Date tbd      Behavioral Health Treatment Plan [facility]: Diagnosis and Treatment Plan explained to Mita Sauceda relates understanding diagnosis and is agreeable to Treatment Plan  Client Comments : Please share your thoughts, feelings, need and/or experiences regarding your treatment plan: Luz Marina and the undersigned will work as a team to help him progress on his goals

## 2022-02-21 NOTE — PSYCH
Psychotherapy Provided: Individual Psychotherapy 45 minutes     Length of time in session: 45 minutes, follow up in 2 week    Bipolar II, insomnia,SANJEEV,Impulse control disorder    Goals addressed in session: Goal 1, Goal 2 and Goal 3      Pain:      moderate to severe    0    Current suicide risk : Low     Data: Luz Marina said work is going better and he can retire in 3 years  He is going to join the depression/bipolar group in [facility]  He stopped his medications and he will discuss this with Dr Sorensen  He recently had to replace a heat pump and a water heater  Regarding his goals his mood he claims is stable and he is managing his anger  He is learning what he can control and what he can't  He is trying to eat more healthy  Assessment: Luz Marina denies suicidal/homicidal ideation, plan or intent  His mood is stable and he has less anger    We continue to work on mindfulness and CBT  Plan: Continue to help him skill build in distress tolerance  Behavioral Health Treatment Plan [facility]: Diagnosis and Treatment Plan explained to Stephany Cartagena relates understanding diagnosis and is agreeable to Treatment Plan   Yes

## 2022-07-06 ENCOUNTER — TELEPHONE (OUTPATIENT)
Dept: PSYCHIATRY | Facility: CLINIC | Age: 52
End: 2022-07-06

## 2022-07-06 NOTE — TELEPHONE ENCOUNTER
Called and lvm advising patient to return call to Intake Dept at [phone]  Please see Refill encounter dated 7/5/2022 regarding provider's notes

## 2024-10-31 DIAGNOSIS — Z00.6 ENCOUNTER FOR EXAMINATION FOR NORMAL COMPARISON OR CONTROL IN CLINICAL RESEARCH PROGRAM: ICD-10-CM
